# Patient Record
Sex: MALE | Race: BLACK OR AFRICAN AMERICAN | NOT HISPANIC OR LATINO | URBAN - METROPOLITAN AREA
[De-identification: names, ages, dates, MRNs, and addresses within clinical notes are randomized per-mention and may not be internally consistent; named-entity substitution may affect disease eponyms.]

---

## 2024-12-28 ENCOUNTER — APPOINTMENT (OUTPATIENT)
Dept: URGENT CARE | Age: 24
End: 2024-12-28
Payer: COMMERCIAL

## 2024-12-28 ENCOUNTER — OFFICE VISIT (OUTPATIENT)
Dept: URGENT CARE | Age: 24
End: 2024-12-28
Payer: COMMERCIAL

## 2024-12-28 VITALS
HEART RATE: 81 BPM | RESPIRATION RATE: 16 BRPM | WEIGHT: 248 LBS | OXYGEN SATURATION: 98 % | BODY MASS INDEX: 31 KG/M2 | TEMPERATURE: 98.4 F | DIASTOLIC BLOOD PRESSURE: 82 MMHG | SYSTOLIC BLOOD PRESSURE: 137 MMHG

## 2024-12-28 DIAGNOSIS — Z11.3 ROUTINE SCREENING FOR STI (SEXUALLY TRANSMITTED INFECTION): Primary | ICD-10-CM

## 2024-12-28 DIAGNOSIS — Z20.2 POSSIBLE EXPOSURE TO STI: ICD-10-CM

## 2024-12-28 LAB
POC APPEARANCE, URINE: CLEAR
POC BILIRUBIN, URINE: NEGATIVE
POC BLOOD, URINE: ABNORMAL
POC COLOR, URINE: YELLOW
POC GLUCOSE, URINE: NEGATIVE MG/DL
POC KETONES, URINE: NEGATIVE MG/DL
POC LEUKOCYTES, URINE: NEGATIVE
POC NITRITE,URINE: NEGATIVE
POC PH, URINE: 6.5 PH
POC PROTEIN, URINE: NEGATIVE MG/DL
POC SPECIFIC GRAVITY, URINE: 1.02
POC UROBILINOGEN, URINE: 0.2 EU/DL

## 2024-12-28 PROCEDURE — 81003 URINALYSIS AUTO W/O SCOPE: CPT | Performed by: NURSE PRACTITIONER

## 2024-12-28 PROCEDURE — 87086 URINE CULTURE/COLONY COUNT: CPT

## 2024-12-28 PROCEDURE — 99203 OFFICE O/P NEW LOW 30 MIN: CPT | Performed by: NURSE PRACTITIONER

## 2024-12-28 PROCEDURE — 87591 N.GONORRHOEAE DNA AMP PROB: CPT

## 2024-12-28 PROCEDURE — 87661 TRICHOMONAS VAGINALIS AMPLIF: CPT

## 2024-12-28 PROCEDURE — 87491 CHLMYD TRACH DNA AMP PROBE: CPT

## 2024-12-28 RX ORDER — METRONIDAZOLE 500 MG/1
500 TABLET ORAL 3 TIMES DAILY
Qty: 14 TABLET | Refills: 0 | Status: SHIPPED | OUTPATIENT
Start: 2024-12-28 | End: 2025-01-07

## 2024-12-30 ENCOUNTER — TELEPHONE (OUTPATIENT)
Dept: URGENT CARE | Age: 24
End: 2024-12-30

## 2024-12-30 LAB
C TRACH RRNA SPEC QL NAA+PROBE: NEGATIVE
N GONORRHOEA DNA SPEC QL PROBE+SIG AMP: NEGATIVE
T VAGINALIS RRNA SPEC QL NAA+PROBE: NEGATIVE

## 2024-12-30 NOTE — TELEPHONE ENCOUNTER
PT CALLED TO GET HIS TEST RESULTS.  CONFIRMED.  PER LAB ALL TESTS ARE NEGATIVE AND THIS WAS RESULTED TO HIM.

## 2024-12-31 LAB — BACTERIA UR CULT: NO GROWTH

## 2024-12-31 PROCEDURE — 96372 THER/PROPH/DIAG INJ SC/IM: CPT | Performed by: NURSE PRACTITIONER

## 2024-12-31 NOTE — PROGRESS NOTES
Subjective   Patient ID: John Daniel is a 24 y.o. male. They present today with a chief complaint of STI Screening (Patient request sti screening without screening).    History of Present Illness    History provided by:  Patient   used: No    Other  Location:  Sti screening      Past Medical History  Allergies as of 12/28/2024    (No Known Allergies)   re    (Not in a hospital admission)       Past Medical History:   Diagnosis Date    Contact with and (suspected) exposure to infections with a predominantly sexual mode of transmission 07/09/2021    Exposure to chlamydia    Encounter for screening for human immunodeficiency virus (HIV) 07/13/2021    Screening for HIV (human immunodeficiency virus)    Encounter for screening for infections with a predominantly sexual mode of transmission 07/13/2021    Screen for STD (sexually transmitted disease)    Encounter for screening for other viral diseases 07/13/2021    Screening for viral disease    Laceration without foreign body of lip, initial encounter 07/31/2015    Lip laceration    Personal history of other diseases of urinary system 07/09/2021    History of hematuria       Past Surgical History:   Procedure Laterality Date    TONSILLECTOMY  07/08/2013    Tonsillectomy        reports that he has been smoking cigarettes. He uses smokeless tobacco. He reports current alcohol use. He reports that he does not use drugs.    Review of Systems  Review of Systems   Constitutional: Negative.    HENT: Negative.     Eyes: Negative.    Respiratory: Negative.     Cardiovascular: Negative.    Gastrointestinal: Negative.    Endocrine: Negative.    Genitourinary: Negative.    Musculoskeletal: Negative.    Skin: Negative.    Hematological: Negative.    Psychiatric/Behavioral: Negative.                                    Objective    Vitals:    12/28/24 1201   BP: 137/82   Pulse: 81   Resp: 16   Temp: 36.9 °C (98.4 °F)   TempSrc: Oral   SpO2: 98%   Weight: 112 kg (248  lb)     No LMP for male patient.    Physical Exam  Vitals and nursing note reviewed.   Constitutional:       Appearance: He is normal weight.   Cardiovascular:      Rate and Rhythm: Normal rate and regular rhythm.      Pulses: Normal pulses.      Heart sounds: Normal heart sounds.   Pulmonary:      Effort: Pulmonary effort is normal.      Breath sounds: Normal breath sounds.   Abdominal:      General: Bowel sounds are normal.      Palpations: Abdomen is soft.   Musculoskeletal:         General: Normal range of motion.      Cervical back: Normal range of motion.   Skin:     General: Skin is warm and dry.   Neurological:      General: No focal deficit present.      Mental Status: He is alert and oriented to person, place, and time. Mental status is at baseline.   Psychiatric:         Mood and Affect: Mood normal.         Behavior: Behavior normal.         Thought Content: Thought content normal.         Judgment: Judgment normal.         Procedures    Point of Care Test & Imaging Results from this visit  Results for orders placed or performed in visit on 12/28/24   Urine Culture    Specimen: Clean Catch/Voided; Urine   Result Value Ref Range    Urine Culture No growth    C. trachomatis / N. gonorrhoeae, Amplified   Result Value Ref Range    Neisseria gonorrhea,Amplified Negative Negative    Chlamydia trachomatis, Amplified Negative Negative   Trichomonas vaginalis, Amplified   Result Value Ref Range    Trichomonas Vaginalis Negative Negative, Invalid, TRICH neg   POCT UA Automated manually resulted   Result Value Ref Range    POC Color, Urine Yellow Straw, Yellow, Light-Yellow    POC Appearance, Urine Clear Clear    POC Glucose, Urine NEGATIVE NEGATIVE mg/dl    POC Bilirubin, Urine NEGATIVE NEGATIVE    POC Ketones, Urine NEGATIVE NEGATIVE mg/dl    POC Specific Gravity, Urine 1.020 1.005 - 1.035    POC Blood, Urine MODERATE (2+) (A) NEGATIVE    POC PH, Urine 6.5 No Reference Range Established PH    POC Protein, Urine  NEGATIVE NEGATIVE mg/dl    POC Urobilinogen, Urine 0.2 0.2, 1.0 EU/DL    Poc Nitrite, Urine NEGATIVE NEGATIVE    POC Leukocytes, Urine NEGATIVE NEGATIVE      No results found.    Diagnostic study results (if any) were reviewed by MICHAEL Meza.    Assessment/Plan   Allergies, medications, history, and pertinent labs/EKGs/Imaging reviewed by MICHAEL Meza.     Medical Decision Making  Medical Decision Making  At time of discharge patient was clinically well-appearing and HDS for outpatient management. The patient and/or family was educated regarding diagnosis, supportive care, OTC and Rx medications. The patient and/or family was given the opportunity to ask questions prior to discharge.  They verbalized understanding of my discussion of the plans for treatment, expected course, indications to return to  or seek further evaluation in ED, and the need for timely follow up as directed.   They were provided with a work/school excuse if requested.        Orders and Diagnoses  Diagnoses and all orders for this visit:  Routine screening for STI (sexually transmitted infection)  -     POCT UA Automated manually resulted  -     cefTRIAXone (Rocephin) 500 mg in lidocaine (Xylocaine) 2 mL injection  -     metroNIDAZOLE (Flagyl) 500 mg tablet; Take 1 tablet (500 mg) by mouth 3 times a day for 10 days.  -     C. trachomatis / N. gonorrhoeae, Amplified  -     Trichomonas vaginalis, Amplified  -     Urine Culture  Possible exposure to STI  -     cefTRIAXone (Rocephin) 500 mg in lidocaine (Xylocaine) 2 mL injection  -     metroNIDAZOLE (Flagyl) 500 mg tablet; Take 1 tablet (500 mg) by mouth 3 times a day for 10 days.  -     C. trachomatis / N. gonorrhoeae, Amplified  -     Trichomonas vaginalis, Amplified  -     Urine Culture      Medical Admin Record  Administrations This Visit       cefTRIAXone (Rocephin) 500 mg in lidocaine (Xylocaine) 2 mL injection       Admin Date  12/31/2024 Action  Given  Dose  500 mg Route  intramuscular Documented By  MICHAEL Meza                  Return to Urgent care if symptoms return or progress  Follow up with PCP in 1-2 weeks   Patient disposition: Home    Electronically signed by MICHAEL Meza  9:11 AM

## 2025-01-14 ASSESSMENT — ENCOUNTER SYMPTOMS
EYES NEGATIVE: 1
PSYCHIATRIC NEGATIVE: 1
MUSCULOSKELETAL NEGATIVE: 1
HEMATOLOGIC/LYMPHATIC NEGATIVE: 1
GASTROINTESTINAL NEGATIVE: 1
CARDIOVASCULAR NEGATIVE: 1
RESPIRATORY NEGATIVE: 1
ENDOCRINE NEGATIVE: 1
CONSTITUTIONAL NEGATIVE: 1